# Patient Record
Sex: MALE | Race: BLACK OR AFRICAN AMERICAN | Employment: FULL TIME | URBAN - METROPOLITAN AREA
[De-identification: names, ages, dates, MRNs, and addresses within clinical notes are randomized per-mention and may not be internally consistent; named-entity substitution may affect disease eponyms.]

---

## 2019-01-11 ENCOUNTER — OFFICE VISIT (OUTPATIENT)
Dept: PEDIATRICS | Facility: CLINIC | Age: 49
End: 2019-01-11
Payer: COMMERCIAL

## 2019-01-11 VITALS
OXYGEN SATURATION: 96 % | HEIGHT: 75 IN | BODY MASS INDEX: 33.78 KG/M2 | DIASTOLIC BLOOD PRESSURE: 88 MMHG | SYSTOLIC BLOOD PRESSURE: 138 MMHG | HEART RATE: 76 BPM | TEMPERATURE: 97.8 F | WEIGHT: 271.7 LBS | RESPIRATION RATE: 18 BRPM

## 2019-01-11 DIAGNOSIS — Z00.00 ROUTINE GENERAL MEDICAL EXAMINATION AT A HEALTH CARE FACILITY: ICD-10-CM

## 2019-01-11 DIAGNOSIS — Z23 NEED FOR VACCINATION: Primary | ICD-10-CM

## 2019-01-11 PROCEDURE — 90471 IMMUNIZATION ADMIN: CPT | Performed by: INTERNAL MEDICINE

## 2019-01-11 PROCEDURE — 80061 LIPID PANEL: CPT | Performed by: INTERNAL MEDICINE

## 2019-01-11 PROCEDURE — 99386 PREV VISIT NEW AGE 40-64: CPT | Mod: 25 | Performed by: INTERNAL MEDICINE

## 2019-01-11 PROCEDURE — 36415 COLL VENOUS BLD VENIPUNCTURE: CPT | Performed by: INTERNAL MEDICINE

## 2019-01-11 PROCEDURE — 82947 ASSAY GLUCOSE BLOOD QUANT: CPT | Performed by: INTERNAL MEDICINE

## 2019-01-11 PROCEDURE — 90715 TDAP VACCINE 7 YRS/> IM: CPT | Performed by: INTERNAL MEDICINE

## 2019-01-11 ASSESSMENT — ENCOUNTER SYMPTOMS
NERVOUS/ANXIOUS: 0
FREQUENCY: 0
HEADACHES: 0
CONSTIPATION: 0
SORE THROAT: 0
COUGH: 0
ABDOMINAL PAIN: 0
NAUSEA: 0
HEMATURIA: 0
ARTHRALGIAS: 0
PARESTHESIAS: 0
SHORTNESS OF BREATH: 0
MYALGIAS: 0
HEMATOCHEZIA: 0
DYSURIA: 0
WEAKNESS: 0
FEVER: 0
DIZZINESS: 0
JOINT SWELLING: 0
EYE PAIN: 0
PALPITATIONS: 0
HEARTBURN: 0
DIARRHEA: 0
CHILLS: 0

## 2019-01-11 ASSESSMENT — MIFFLIN-ST. JEOR: SCORE: 2193.05

## 2019-01-11 NOTE — NURSING NOTE
Screening Questionnaire for Adult Immunization    Are you sick today?   No   Do you have allergies to medications, food, a vaccine component or latex?   No   Have you ever had a serious reaction after receiving a vaccination?   No   Do you have a long-term health problem with heart disease, lung disease, asthma, kidney disease, metabolic disease (e.g. diabetes), anemia, or other blood disorder?   No   Do you have cancer, leukemia, HIV/AIDS, or any other immune system problem?   No   In the past 3 months, have you taken medications that affect  your immune system, such as prednisone, other steroids, or anticancer drugs; drugs for the treatment of rheumatoid arthritis, Crohn s disease, or psoriasis; or have you had radiation treatments?   No   Have you had a seizure, or a brain or other nervous system problem?   No   During the past year, have you received a transfusion of blood or blood     products, or been given immune (gamma) globulin or antiviral drug?   No   For women: Are you pregnant or is there a chance you could become        pregnant during the next month?   No   Have you received any vaccinations in the past 4 weeks?   No     Immunization questionnaire answers were all negative.        Per orders of Dr. Pal, injection of Adacel (TDaP) given by Jose Martin Coleman. Patient instructed to remain in clinic for 15 minutes afterwards, and to report any adverse reaction to me immediately.       Screening performed by Jose Martin Coleman CMA on 1/11/2019 at 8:09 AM.

## 2019-01-11 NOTE — PATIENT INSTRUCTIONS
Check your blood pressure periodically     Optimal BP readings: <130 / <85   Too high: >140 / >90   Very high: >180 / >105      Preventive Health Recommendations    Yearly exam:             See your health care provider every year in order to  o   Review health changes.   o   Discuss preventive care.    o   Review your medicines.    Have a cholesterol test at least every 5 years.     Have a colonoscopy (test for colon cancer) if someone in your family has had colon cancer or polyps before age 50.     Have a diabetes test (fasting glucose) every 1-2 years.      Shots: Get a flu shot each year. Get a tetanus shot every 10 years.     Nutrition:    Eat at least 5 servings of fruits and vegetables daily.     Eat whole-grain bread, whole-wheat pasta and brown rice instead of white grains and rice.     Get adequate Calcium and Vitamin D.     Lifestyle    Exercise for at least 150 minutes a week (30 minutes a day, 5 days a week). This will help you control your weight and prevent disease.     Limit alcohol to one drink per day.     No smoking.     Wear sunscreen to prevent skin cancer.     See your dentist every six months for an exam and cleaning.

## 2019-01-11 NOTE — PROGRESS NOTES
SUBJECTIVE:   CC: José Miguel Bansal is an 48 year old male who presents for preventative health visit.     Physical   Annual:     Getting at least 3 servings of Calcium per day:  Yes    Bi-annual eye exam:  Yes    Dental care twice a year:  Yes    Sleep apnea or symptoms of sleep apnea:  None    Diet:  Regular (no restrictions)    Frequency of exercise:  2-3 days/week    Duration of exercise:  30-45 minutes    Taking medications regularly:  Not Applicable    Additional concerns today:  No    PHQ-2 Total Score: 0    No acute c/o today. LOV here was over 10 years ago.    Has been dx w/ situational anxiety. Using CBD oil, does help his sx.     Obesity. Plans to start working on weight loss.  Wt Readings from Last 4 Encounters:   01/11/19 123.2 kg (271 lb 11.2 oz)   01/18/08 115 kg (253 lb 8 oz)   01/16/08 117.9 kg (260 lb)   12/17/04 112.9 kg (249 lb)     Today's PHQ-2 Score:   PHQ-2 ( 1999 Pfizer) 1/11/2019   Q1: Little interest or pleasure in doing things 0   Q2: Feeling down, depressed or hopeless 0   PHQ-2 Score 0   Q1: Little interest or pleasure in doing things Not at all   Q2: Feeling down, depressed or hopeless Not at all   PHQ-2 Score 0       Abuse: Current or Past(Physical, Sexual or Emotional)- No  Do you feel safe in your environment? Yes    Social History     Tobacco Use     Smoking status: Never Smoker   Substance Use Topics     Alcohol use: Yes     Alcohol/week: 4.0 oz     Alcohol Use 1/11/2019   If you drink alcohol do you typically have greater than 3 drinks per day OR greater than 7 drinks per week? No       Reviewed orders with patient. Reviewed health maintenance and updated orders accordingly - Yes      Reviewed and updated as needed this visit by Provider  Allergies  Meds  Problems  Med Hx  Surg Hx  Fam Hx          Review of Systems   Constitutional: Negative for chills and fever.   HENT: Negative for congestion, ear pain, hearing loss and sore throat.    Eyes: Negative for pain and visual  "disturbance.   Respiratory: Negative for cough and shortness of breath.    Cardiovascular: Negative for chest pain, palpitations and peripheral edema.   Gastrointestinal: Negative for abdominal pain, constipation, diarrhea, heartburn, hematochezia and nausea.   Genitourinary: Negative for dysuria, frequency, genital sores, hematuria and urgency.   Musculoskeletal: Negative for arthralgias, joint swelling and myalgias.   Skin: Negative for rash.   Neurological: Negative for dizziness, weakness, headaches and paresthesias.   Psychiatric/Behavioral: Negative for mood changes. The patient is not nervous/anxious.        OBJECTIVE:   /88   Pulse 76   Temp 97.8  F (36.6  C) (Oral)   Resp 18   Ht 1.905 m (6' 3\")   Wt 123.2 kg (271 lb 11.2 oz)   SpO2 96%   BMI 33.96 kg/m      Physical Exam  GENERAL: healthy, alert and no distress  EYES: Eyes grossly normal to inspection, PERRL and conjunctivae and sclerae normal  HENT: ear canals and TM's normal, nose and mouth without ulcers or lesions  NECK: no adenopathy, no asymmetry, masses, or scars and thyroid normal to palpation  RESP: lungs clear to auscultation - no rales, rhonchi or wheezes  CV: regular rate and rhythm, normal S1 S2, no S3 or S4, no murmur, click or rub, no peripheral edema and peripheral pulses strong  ABDOMEN: soft, nontender, no hepatosplenomegaly, no masses and bowel sounds normal  : normal male genitalia without lesions or urethral discharge, no hernia  MS: no gross musculoskeletal defects noted, no edema  SKIN: no suspicious lesions or rashes  NEURO: Normal strength and tone, mentation intact and speech normal  PSYCH: mentation appears normal, affect normal/bright    ASSESSMENT/PLAN:       ICD-10-CM    1. Need for vaccination Z23 TDAP, IM (10 - 64 YRS) - Adacel   2. Routine general medical examination at a health care facility Z00.00 GLUCOSE     Lipid Profile (Chol, Trig, HDL, LDL calc)       COUNSELING:   Reviewed preventive health " "counseling, as reflected in patient instructions    BP Readings from Last 1 Encounters:   01/11/19 138/88     Estimated body mass index is 33.96 kg/m  as calculated from the following:    Height as of this encounter: 1.905 m (6' 3\").    Weight as of this encounter: 123.2 kg (271 lb 11.2 oz).    BP Screening:   Last 3 BP Readings:    BP Readings from Last 3 Encounters:   01/11/19 138/88   01/18/08 132/88   01/16/08 124/88     The following was recommended to the patient:  Re-screen BP within a year and recommended lifestyle modifications     reports that  has never smoked. He does not have any smokeless tobacco history on file.    Jamal Pal MD  [facility]  "

## 2019-01-12 LAB
CHOLEST SERPL-MCNC: 269 MG/DL
GLUCOSE SERPL-MCNC: 92 MG/DL (ref 70–99)
HDLC SERPL-MCNC: 34 MG/DL
LDLC SERPL CALC-MCNC: 212 MG/DL
NONHDLC SERPL-MCNC: 235 MG/DL
TRIGL SERPL-MCNC: 117 MG/DL

## 2019-10-01 ENCOUNTER — HEALTH MAINTENANCE LETTER (OUTPATIENT)
Age: 49
End: 2019-10-01

## 2020-03-19 ENCOUNTER — TELEPHONE (OUTPATIENT)
Dept: PEDIATRICS | Facility: CLINIC | Age: 50
End: 2020-03-19

## 2020-03-19 NOTE — TELEPHONE ENCOUNTER
Left voicemail for patient to call us back and we need to reschedule patient's appointment 90 days out due to COVID 19.

## 2020-03-22 ENCOUNTER — HEALTH MAINTENANCE LETTER (OUTPATIENT)
Age: 50
End: 2020-03-22

## 2020-06-25 ENCOUNTER — TELEPHONE (OUTPATIENT)
Dept: PEDIATRICS | Facility: CLINIC | Age: 50
End: 2020-06-25

## 2020-06-25 NOTE — TELEPHONE ENCOUNTER
1st attempt: Patient's appointment 07/10/20 needs to be rescheduled to the fall (August/September).  Left message for patient informing him of this. Requested a call back to reschedule.  Provided clinic number.  Upon return call, please reschedule patient for August or later.    Randi Esqueda

## 2020-06-29 NOTE — TELEPHONE ENCOUNTER
2nd attempt:  Left message for patient to reschedule 7/10/20 appointment, due to provider availability.  Requested a call back to reschedule. Provided clinic number. Patient can reschedule to fall, otherwise can reschedule to this week sometime if patient would like sooner appointment.     Randi Esqueda

## 2020-07-01 NOTE — TELEPHONE ENCOUNTER
3rd attempt:  Left message and sent mychart message to patient to reschedule preventative visit to the fall.  Appointment has been cancelled at this time.     Randi Esqueda

## 2020-09-23 ENCOUNTER — OFFICE VISIT (OUTPATIENT)
Dept: PEDIATRICS | Facility: CLINIC | Age: 50
End: 2020-09-23
Payer: COMMERCIAL

## 2020-09-23 VITALS
BODY MASS INDEX: 31.21 KG/M2 | TEMPERATURE: 97.7 F | OXYGEN SATURATION: 98 % | RESPIRATION RATE: 16 BRPM | HEIGHT: 75 IN | SYSTOLIC BLOOD PRESSURE: 128 MMHG | DIASTOLIC BLOOD PRESSURE: 86 MMHG | HEART RATE: 76 BPM | WEIGHT: 251 LBS

## 2020-09-23 DIAGNOSIS — Z00.00 ROUTINE GENERAL MEDICAL EXAMINATION AT A HEALTH CARE FACILITY: Primary | ICD-10-CM

## 2020-09-23 PROCEDURE — 99396 PREV VISIT EST AGE 40-64: CPT | Performed by: INTERNAL MEDICINE

## 2020-09-23 PROCEDURE — 80061 LIPID PANEL: CPT | Performed by: INTERNAL MEDICINE

## 2020-09-23 PROCEDURE — 36415 COLL VENOUS BLD VENIPUNCTURE: CPT | Performed by: INTERNAL MEDICINE

## 2020-09-23 PROCEDURE — 80053 COMPREHEN METABOLIC PANEL: CPT | Performed by: INTERNAL MEDICINE

## 2020-09-23 ASSESSMENT — ENCOUNTER SYMPTOMS
SHORTNESS OF BREATH: 0
SORE THROAT: 0
ABDOMINAL PAIN: 0
CONSTIPATION: 0
JOINT SWELLING: 0
HEMATURIA: 0
FEVER: 0
EYE PAIN: 0
PARESTHESIAS: 0
HEARTBURN: 0
DIARRHEA: 0
HEADACHES: 0
NERVOUS/ANXIOUS: 0
PALPITATIONS: 0
DIZZINESS: 0
ARTHRALGIAS: 0
DYSURIA: 0
CHILLS: 0
MYALGIAS: 0
HEMATOCHEZIA: 0
NAUSEA: 0
FREQUENCY: 0
COUGH: 0
WEAKNESS: 0

## 2020-09-23 ASSESSMENT — MIFFLIN-ST. JEOR: SCORE: 2094.16

## 2020-09-23 NOTE — PATIENT INSTRUCTIONS
Preventive Health Recommendations    Yearly exam:             See your health care provider every year in order to  o   Review health changes.   o   Discuss preventive care.      Have a cholesterol test every year.     Have a colonoscopy (test for colon cancer) if someone in your family has had colon cancer or polyps before age 50.     Shots: Get a flu shot each year. Get a tetanus shot every 10 years.      Nutrition:    Eat at least 5 servings of fruits and vegetables daily.     Eat whole-grain bread, whole-wheat pasta and brown rice instead of white grains and rice.     Get adequate Calcium and Vitamin D.     Lifestyle    Exercise for at least 150 minutes a week (30 minutes a day, 5 days a week). This will help you control your weight and prevent disease.     Limit alcohol to one drink per day.     No smoking.     Wear sunscreen to prevent skin cancer.     See your dentist every six months for an exam and cleaning.

## 2020-09-23 NOTE — PROGRESS NOTES
SUBJECTIVE:   CC: José Miguel Bansal is an 49 year old male who presents for preventative health visit.       Healthy Habits:     Getting at least 3 servings of Calcium per day:  Yes    Bi-annual eye exam:  Yes    Dental care twice a year:  Yes    Sleep apnea or symptoms of sleep apnea:  None    Diet:  Regular (no restrictions)    Frequency of exercise:  4-5 days/week    Duration of exercise:  30-45 minutes    Taking medications regularly:  Not Applicable    Medication side effects:  Not applicable    PHQ-2 Total Score: 0    Additional concerns today:  No    Initial BP is elevated today.  BP Readings from Last 3 Encounters:   09/23/20 (!) 158/110   01/11/19 138/88   01/18/08 132/88     Wt Readings from Last 4 Encounters:   09/23/20 113.9 kg (251 lb)   01/11/19 123.2 kg (271 lb 11.2 oz)   01/18/08 115 kg (253 lb 8 oz)   01/16/08 117.9 kg (260 lb)     No cardiac sx such as CP, palpitations, PND, orthopnea, LOVE or peripheral edema.     Reviewed prior lab results. LDL has been quite high.  Lab Results   Component Value Date     01/11/2019     12/17/2004     No meds currently for lipids.  His mother had an MI in her 60's.  Discussed that he may need a statin.  Is not fasting today but still wants labwork drawn.     Today's PHQ-2 Score:   PHQ-2 ( 1999 Pfizer) 9/23/2020   Q1: Little interest or pleasure in doing things 0   Q2: Feeling down, depressed or hopeless 0   PHQ-2 Score 0   Q1: Little interest or pleasure in doing things Not at all   Q2: Feeling down, depressed or hopeless Not at all   PHQ-2 Score 0       Abuse: Current or Past(Physical, Sexual or Emotional)- No  Do you feel safe in your environment? Yes        Social History     Tobacco Use     Smoking status: Never Smoker     Smokeless tobacco: Never Used   Substance Use Topics     Alcohol use: Not Currently     Alcohol/week: 6.7 standard drinks         Alcohol Use 9/23/2020   Prescreen: >3 drinks/day or >7 drinks/week? No       Reviewed orders with  "patient. Reviewed health maintenance and updated orders accordingly - Yes      Reviewed and updated as needed this visit by Provider  Tobacco  Allergies  Meds  Problems  Med Hx  Surg Hx  Fam Hx            Review of Systems   Constitutional: Negative for chills and fever.   HENT: Negative for congestion, ear pain, hearing loss and sore throat.    Eyes: Negative for pain and visual disturbance.   Respiratory: Negative for cough and shortness of breath.    Cardiovascular: Negative for chest pain, palpitations and peripheral edema.   Gastrointestinal: Negative for abdominal pain, constipation, diarrhea, heartburn, hematochezia and nausea.   Genitourinary: Negative for discharge, dysuria, frequency, genital sores, hematuria, impotence and urgency.   Musculoskeletal: Negative for arthralgias, joint swelling and myalgias.   Skin: Negative for rash.   Neurological: Negative for dizziness, weakness, headaches and paresthesias.   Psychiatric/Behavioral: Negative for mood changes. The patient is not nervous/anxious.        OBJECTIVE:   /86   Pulse 76   Temp 97.7  F (36.5  C) (Tympanic)   Resp 16   Ht 1.905 m (6' 3\")   Wt 113.9 kg (251 lb)   SpO2 98%   BMI 31.37 kg/m      Physical Exam  GENERAL: healthy, alert and no distress  EYES: Eyes grossly normal to inspection, PERRL and conjunctivae and sclerae normal  HENT: ear canals and TM's normal  NECK: no adenopathy, no asymmetry, masses, or scars and thyroid normal to palpation  RESP: lungs clear to auscultation - no rales, rhonchi or wheezes  CV: regular rate and rhythm, normal S1 S2, no S3 or S4, no murmur, click or rub, no peripheral edema and peripheral pulses strong  ABDOMEN: soft, nontender, no hepatosplenomegaly, no masses and bowel sounds normal  MS: no gross musculoskeletal defects noted, no edema  SKIN: no suspicious lesions or rashes  NEURO: Normal strength and tone, mentation intact and speech normal  PSYCH: mentation appears normal, affect " "normal/bright      ASSESSMENT/PLAN:       ICD-10-CM    1. Routine general medical examination at a health care facility  Z00.00 Lipid Profile (Chol, Trig, HDL, LDL calc)     Comprehensive metabolic panel (BMP + Alb, Alk Phos, ALT, AST, Total. Bili, TP)     Repeat labs show significant improvement in LDL:    Lab Results   Component Value Date     09/23/2020     01/11/2019     No meds needed at this time for lipids.    Patient has been advised of split billing requirements and indicates understanding: Yes  COUNSELING:   Reviewed preventive health counseling, as reflected in patient instructions    Estimated body mass index is 31.37 kg/m  as calculated from the following:    Height as of this encounter: 1.905 m (6' 3\").    Weight as of this encounter: 113.9 kg (251 lb).     Weight management plan: Discussed healthy diet and exercise guidelines    He reports that he has never smoked. He has never used smokeless tobacco.      Jamal Pal MD  [facility]  "

## 2020-09-24 LAB
ALBUMIN SERPL-MCNC: 4.2 G/DL (ref 3.4–5)
ALP SERPL-CCNC: 94 U/L (ref 40–150)
ALT SERPL W P-5'-P-CCNC: 96 U/L (ref 0–70)
ANION GAP SERPL CALCULATED.3IONS-SCNC: 9 MMOL/L (ref 3–14)
AST SERPL W P-5'-P-CCNC: 103 U/L (ref 0–45)
BILIRUB SERPL-MCNC: 0.5 MG/DL (ref 0.2–1.3)
BUN SERPL-MCNC: 9 MG/DL (ref 7–30)
CALCIUM SERPL-MCNC: 9.5 MG/DL (ref 8.5–10.1)
CHLORIDE SERPL-SCNC: 106 MMOL/L (ref 94–109)
CHOLEST SERPL-MCNC: 212 MG/DL
CO2 SERPL-SCNC: 22 MMOL/L (ref 20–32)
CREAT SERPL-MCNC: 0.93 MG/DL (ref 0.66–1.25)
GFR SERPL CREATININE-BSD FRML MDRD: >90 ML/MIN/{1.73_M2}
GLUCOSE SERPL-MCNC: 85 MG/DL (ref 70–99)
HDLC SERPL-MCNC: 46 MG/DL
LDLC SERPL CALC-MCNC: 139 MG/DL
NONHDLC SERPL-MCNC: 166 MG/DL
POTASSIUM SERPL-SCNC: 3.9 MMOL/L (ref 3.4–5.3)
PROT SERPL-MCNC: 8.8 G/DL (ref 6.8–8.8)
SODIUM SERPL-SCNC: 137 MMOL/L (ref 133–144)
TRIGL SERPL-MCNC: 133 MG/DL

## 2021-01-15 ENCOUNTER — HEALTH MAINTENANCE LETTER (OUTPATIENT)
Age: 51
End: 2021-01-15

## 2021-04-05 ENCOUNTER — IMMUNIZATION (OUTPATIENT)
Dept: NURSING | Facility: CLINIC | Age: 51
End: 2021-04-05
Payer: COMMERCIAL

## 2021-04-05 PROCEDURE — 91300 PR COVID VAC PFIZER DIL RECON 30 MCG/0.3 ML IM: CPT

## 2021-04-05 PROCEDURE — 0001A PR COVID VAC PFIZER DIL RECON 30 MCG/0.3 ML IM: CPT

## 2021-04-26 ENCOUNTER — IMMUNIZATION (OUTPATIENT)
Dept: NURSING | Facility: CLINIC | Age: 51
End: 2021-04-26
Attending: INTERNAL MEDICINE
Payer: COMMERCIAL

## 2021-04-26 PROCEDURE — 0002A PR COVID VAC PFIZER DIL RECON 30 MCG/0.3 ML IM: CPT

## 2021-04-26 PROCEDURE — 91300 PR COVID VAC PFIZER DIL RECON 30 MCG/0.3 ML IM: CPT

## 2021-09-04 ENCOUNTER — HEALTH MAINTENANCE LETTER (OUTPATIENT)
Age: 51
End: 2021-09-04

## 2021-10-30 ENCOUNTER — HEALTH MAINTENANCE LETTER (OUTPATIENT)
Age: 51
End: 2021-10-30

## 2022-07-13 ENCOUNTER — MYC MEDICAL ADVICE (OUTPATIENT)
Dept: PEDIATRICS | Facility: CLINIC | Age: 52
End: 2022-07-13

## 2022-07-13 DIAGNOSIS — T75.3XXA MOTION SICKNESS, INITIAL ENCOUNTER: Primary | ICD-10-CM

## 2022-07-14 RX ORDER — SCOLOPAMINE TRANSDERMAL SYSTEM 1 MG/1
1 PATCH, EXTENDED RELEASE TRANSDERMAL
Qty: 4 PATCH | Refills: 1 | Status: SHIPPED | OUTPATIENT
Start: 2022-07-14 | End: 2023-04-07

## 2022-07-14 NOTE — TELEPHONE ENCOUNTER
Dr. Pal,     Please see [facility] message from pt regarding motion sickness patch    Med pended- please review    Thank you  Nemo Puente RN on 7/14/2022 at 8:52 AM

## 2022-08-12 ENCOUNTER — TRANSFERRED RECORDS (OUTPATIENT)
Dept: HEALTH INFORMATION MANAGEMENT | Facility: CLINIC | Age: 52
End: 2022-08-12

## 2022-10-16 ENCOUNTER — HEALTH MAINTENANCE LETTER (OUTPATIENT)
Age: 52
End: 2022-10-16

## 2022-12-01 ENCOUNTER — TRANSFERRED RECORDS (OUTPATIENT)
Dept: HEALTH INFORMATION MANAGEMENT | Facility: CLINIC | Age: 52
End: 2022-12-01

## 2022-12-03 ENCOUNTER — HEALTH MAINTENANCE LETTER (OUTPATIENT)
Age: 52
End: 2022-12-03

## 2023-04-07 ENCOUNTER — OFFICE VISIT (OUTPATIENT)
Dept: PEDIATRICS | Facility: CLINIC | Age: 53
End: 2023-04-07
Payer: COMMERCIAL

## 2023-04-07 VITALS
RESPIRATION RATE: 14 BRPM | WEIGHT: 225 LBS | OXYGEN SATURATION: 99 % | SYSTOLIC BLOOD PRESSURE: 134 MMHG | BODY MASS INDEX: 27.98 KG/M2 | HEART RATE: 75 BPM | HEIGHT: 75 IN | DIASTOLIC BLOOD PRESSURE: 88 MMHG

## 2023-04-07 DIAGNOSIS — Z12.11 SCREEN FOR COLON CANCER: ICD-10-CM

## 2023-04-07 DIAGNOSIS — Z00.00 ROUTINE GENERAL MEDICAL EXAMINATION AT A HEALTH CARE FACILITY: Primary | ICD-10-CM

## 2023-04-07 LAB
CHOLEST SERPL-MCNC: 204 MG/DL
FASTING STATUS PATIENT QL REPORTED: YES
GLUCOSE SERPL-MCNC: 87 MG/DL (ref 70–99)
HDLC SERPL-MCNC: 43 MG/DL
LDLC SERPL CALC-MCNC: 149 MG/DL
NONHDLC SERPL-MCNC: 161 MG/DL
PSA SERPL DL<=0.01 NG/ML-MCNC: 0.84 NG/ML (ref 0–3.5)
TRIGL SERPL-MCNC: 58 MG/DL

## 2023-04-07 PROCEDURE — 36415 COLL VENOUS BLD VENIPUNCTURE: CPT | Performed by: INTERNAL MEDICINE

## 2023-04-07 PROCEDURE — 82947 ASSAY GLUCOSE BLOOD QUANT: CPT | Performed by: INTERNAL MEDICINE

## 2023-04-07 PROCEDURE — G0103 PSA SCREENING: HCPCS | Performed by: INTERNAL MEDICINE

## 2023-04-07 PROCEDURE — 99396 PREV VISIT EST AGE 40-64: CPT | Performed by: INTERNAL MEDICINE

## 2023-04-07 PROCEDURE — 80061 LIPID PANEL: CPT | Performed by: INTERNAL MEDICINE

## 2023-04-07 SDOH — ECONOMIC STABILITY: TRANSPORTATION INSECURITY
IN THE PAST 12 MONTHS, HAS THE LACK OF TRANSPORTATION KEPT YOU FROM MEDICAL APPOINTMENTS OR FROM GETTING MEDICATIONS?: NO

## 2023-04-07 SDOH — HEALTH STABILITY: PHYSICAL HEALTH: ON AVERAGE, HOW MANY DAYS PER WEEK DO YOU ENGAGE IN MODERATE TO STRENUOUS EXERCISE (LIKE A BRISK WALK)?: 3 DAYS

## 2023-04-07 SDOH — ECONOMIC STABILITY: INCOME INSECURITY: HOW HARD IS IT FOR YOU TO PAY FOR THE VERY BASICS LIKE FOOD, HOUSING, MEDICAL CARE, AND HEATING?: NOT HARD AT ALL

## 2023-04-07 SDOH — ECONOMIC STABILITY: FOOD INSECURITY: WITHIN THE PAST 12 MONTHS, YOU WORRIED THAT YOUR FOOD WOULD RUN OUT BEFORE YOU GOT MONEY TO BUY MORE.: NEVER TRUE

## 2023-04-07 SDOH — HEALTH STABILITY: PHYSICAL HEALTH: ON AVERAGE, HOW MANY MINUTES DO YOU ENGAGE IN EXERCISE AT THIS LEVEL?: 30 MIN

## 2023-04-07 SDOH — ECONOMIC STABILITY: FOOD INSECURITY: WITHIN THE PAST 12 MONTHS, THE FOOD YOU BOUGHT JUST DIDN'T LAST AND YOU DIDN'T HAVE MONEY TO GET MORE.: NEVER TRUE

## 2023-04-07 SDOH — ECONOMIC STABILITY: INCOME INSECURITY: IN THE LAST 12 MONTHS, WAS THERE A TIME WHEN YOU WERE NOT ABLE TO PAY THE MORTGAGE OR RENT ON TIME?: NO

## 2023-04-07 SDOH — ECONOMIC STABILITY: TRANSPORTATION INSECURITY
IN THE PAST 12 MONTHS, HAS LACK OF TRANSPORTATION KEPT YOU FROM MEETINGS, WORK, OR FROM GETTING THINGS NEEDED FOR DAILY LIVING?: NO

## 2023-04-07 ASSESSMENT — SOCIAL DETERMINANTS OF HEALTH (SDOH)
DO YOU BELONG TO ANY CLUBS OR ORGANIZATIONS SUCH AS CHURCH GROUPS UNIONS, FRATERNAL OR ATHLETIC GROUPS, OR SCHOOL GROUPS?: NO
HOW OFTEN DO YOU GET TOGETHER WITH FRIENDS OR RELATIVES?: TWICE A WEEK
HOW OFTEN DO YOU ATTEND CHURCH OR RELIGIOUS SERVICES?: 1 TO 4 TIMES PER YEAR
IN A TYPICAL WEEK, HOW MANY TIMES DO YOU TALK ON THE PHONE WITH FAMILY, FRIENDS, OR NEIGHBORS?: MORE THAN THREE TIMES A WEEK

## 2023-04-07 ASSESSMENT — ENCOUNTER SYMPTOMS
PARESTHESIAS: 0
COUGH: 0
DYSURIA: 0
EYE PAIN: 0
ABDOMINAL PAIN: 0
NERVOUS/ANXIOUS: 0
FREQUENCY: 0
HEMATOCHEZIA: 0
PALPITATIONS: 0
WEAKNESS: 0
NAUSEA: 0
DIARRHEA: 0
SHORTNESS OF BREATH: 0
HEARTBURN: 0
SORE THROAT: 0
HEADACHES: 0
MYALGIAS: 0
JOINT SWELLING: 0
ARTHRALGIAS: 0
DIZZINESS: 0
CONSTIPATION: 0
HEMATURIA: 0
CHILLS: 0
FEVER: 0

## 2023-04-07 ASSESSMENT — LIFESTYLE VARIABLES
HOW OFTEN DO YOU HAVE SIX OR MORE DRINKS ON ONE OCCASION: NEVER
AUDIT-C TOTAL SCORE: 3
HOW MANY STANDARD DRINKS CONTAINING ALCOHOL DO YOU HAVE ON A TYPICAL DAY: 1 OR 2
HOW OFTEN DO YOU HAVE A DRINK CONTAINING ALCOHOL: 2-3 TIMES A WEEK
SKIP TO QUESTIONS 9-10: 1

## 2023-04-07 NOTE — PROGRESS NOTES
SUBJECTIVE:   CC: José Miguel is an 52 year old who presents for preventative health visit.     Patient has been advised of split billing requirements and indicates understanding: Yes     Healthy Habits:     Getting at least 3 servings of Calcium per day:  Yes    Bi-annual eye exam:  Yes    Dental care twice a year:  Yes    Sleep apnea or symptoms of sleep apnea:  None    Diet:  Regular (no restrictions)    Frequency of exercise:  2-3 days/week    Duration of exercise:  30-45 minutes    Taking medications regularly:  Yes    PHQ-2 Total Score: 0    Additional concerns today:  No    Here for CPE. Overall feeling well. No acute concerns today.     Today's PHQ-2 Score:       4/7/2023     7:45 AM   PHQ-2 ( 1999 Pfizer)   Q1: Little interest or pleasure in doing things 0   Q2: Feeling down, depressed or hopeless 0   PHQ-2 Score 0   Q1: Little interest or pleasure in doing things Not at all    Not at all   Q2: Feeling down, depressed or hopeless Not at all    Not at all   PHQ-2 Score 0    0       Social History     Tobacco Use     Smoking status: Never     Smokeless tobacco: Never   Vaping Use     Vaping status: Never Used   Substance Use Topics     Alcohol use: Not Currently     Alcohol/week: 6.7 standard drinks of alcohol             4/7/2023     7:45 AM   Alcohol Use   Prescreen: >3 drinks/day or >7 drinks/week? No     Last PSA: No results found for: PSA    Reviewed orders with patient. Reviewed health maintenance and updated orders accordingly - Yes        Review of Systems   Constitutional: Negative for chills and fever.   HENT: Negative for congestion, ear pain, hearing loss and sore throat.    Eyes: Negative for pain and visual disturbance.   Respiratory: Negative for cough and shortness of breath.    Cardiovascular: Negative for chest pain, palpitations and peripheral edema.   Gastrointestinal: Negative for abdominal pain, constipation, diarrhea, heartburn, hematochezia and nausea.   Genitourinary: Negative for dysuria,  "frequency, genital sores, hematuria, impotence and urgency.   Musculoskeletal: Negative for arthralgias, joint swelling and myalgias.   Skin: Negative for rash.   Neurological: Negative for dizziness, weakness, headaches and paresthesias.   Psychiatric/Behavioral: Negative for mood changes. The patient is not nervous/anxious.        OBJECTIVE:   /88   Pulse 75   Resp 14   Ht 1.905 m (6' 3\")   Wt 102.1 kg (225 lb)   SpO2 99%   BMI 28.12 kg/m      Physical Exam  GENERAL: healthy, alert and no distress  EYES: PERRL, EOMI  HENT: ear canals and TM's normal  NECK: no adenopathy  RESP: lungs clear to auscultation - no rales, rhonchi or wheezes  CV: regular rate and rhythm, normal S1 S2, no murmur, no peripheral edema and peripheral pulses strong  ABDOMEN: soft, nontender, bowel sounds normal  MS: no gross musculoskeletal defects noted  SKIN: no suspicious lesions or rashes  NEURO: Normal strength and tone  PSYCH: mentation appears normal, affect normal/bright       ASSESSMENT/PLAN:       ICD-10-CM    1. Routine general medical examination at a health care facility  Z00.00 Lipid panel reflex to direct LDL Fasting     Glucose     Prostate Specific Antigen Screen      2. Screen for colon cancer  Z12.11 Colonoscopy Screening  Referral         Overall doing well.  Routine screening tests ordered.       COUNSELING:   Reviewed preventive health counseling, as reflected in patient instructions        He reports that he has never smoked. He has never used smokeless tobacco.        Jamal Pal MD  [facility] [place]  "

## 2023-04-10 ENCOUNTER — TRANSFERRED RECORDS (OUTPATIENT)
Dept: HEALTH INFORMATION MANAGEMENT | Facility: CLINIC | Age: 53
End: 2023-04-10
Payer: COMMERCIAL

## 2023-07-07 ENCOUNTER — TRANSFERRED RECORDS (OUTPATIENT)
Dept: HEALTH INFORMATION MANAGEMENT | Facility: CLINIC | Age: 53
End: 2023-07-07
Payer: COMMERCIAL

## 2023-07-21 ENCOUNTER — TRANSFERRED RECORDS (OUTPATIENT)
Dept: HEALTH INFORMATION MANAGEMENT | Facility: CLINIC | Age: 53
End: 2023-07-21
Payer: COMMERCIAL

## 2023-07-27 ENCOUNTER — TRANSFERRED RECORDS (OUTPATIENT)
Dept: HEALTH INFORMATION MANAGEMENT | Facility: CLINIC | Age: 53
End: 2023-07-27
Payer: COMMERCIAL

## 2023-08-11 ENCOUNTER — OFFICE VISIT (OUTPATIENT)
Dept: PEDIATRICS | Facility: CLINIC | Age: 53
End: 2023-08-11
Payer: COMMERCIAL

## 2023-08-11 VITALS
BODY MASS INDEX: 28.5 KG/M2 | TEMPERATURE: 97.4 F | OXYGEN SATURATION: 98 % | SYSTOLIC BLOOD PRESSURE: 136 MMHG | RESPIRATION RATE: 14 BRPM | WEIGHT: 228 LBS | DIASTOLIC BLOOD PRESSURE: 84 MMHG | HEART RATE: 86 BPM

## 2023-08-11 DIAGNOSIS — S83.282D TEAR OF LATERAL MENISCUS OF LEFT KNEE, CURRENT, UNSPECIFIED TEAR TYPE, SUBSEQUENT ENCOUNTER: ICD-10-CM

## 2023-08-11 DIAGNOSIS — F98.8 ATTENTION DEFICIT DISORDER (ADD) WITHOUT HYPERACTIVITY: ICD-10-CM

## 2023-08-11 DIAGNOSIS — Z01.818 PREOP GENERAL PHYSICAL EXAM: Primary | ICD-10-CM

## 2023-08-11 PROCEDURE — 99214 OFFICE O/P EST MOD 30 MIN: CPT | Performed by: INTERNAL MEDICINE

## 2023-08-11 RX ORDER — ATOMOXETINE 40 MG/1
40 CAPSULE ORAL DAILY
Qty: 30 CAPSULE | Refills: 1 | Status: SHIPPED | OUTPATIENT
Start: 2023-08-11 | End: 2023-08-31

## 2023-08-11 NOTE — PROGRESS NOTES
[facility]  [address]  [address]  [place] [address]  Phone: [phone]  Fax: [phone]  Primary Provider: Jamal Pal      PREOPERATIVE EVALUATION:  Today's date: 8/11/2023    José Miguel Bansal is a 52 year old male who presents for a preoperative evaluation.    Surgical Information:  Surgery/Procedure: Left Knee Scopy  Surgery Location: [facility]  Surgeon:   Surgery Date: 8/30/2023  Time of Surgery: A.M.   Where patient plans to recover: At home with family  Fax number for surgical facility: [phone]    Assessment & Plan     The proposed surgical procedure is considered LOW risk.      ICD-10-CM    1. Preop general physical exam  Z01.818       2. Tear of lateral meniscus of left knee, current, unspecified tear type, subsequent encounter  S83.282D       3. Attention deficit disorder (ADD) without hyperactivity  F98.8 atomoxetine (STRATTERA) 40 MG capsule             RECOMMENDATION:  APPROVAL GIVEN to proceed with proposed procedure, without further diagnostic evaluation.    Of note he is restarting Strattera for treatment of ADD.  He can continue taking that medication to and through the time of his surgery.      Subjective       HPI related to upcoming procedure: He sustained a tear of his left knee medial meniscus.  Has been getting steroid injections which provide some relief but there is no plan for definitive management with surgical intervention        8/11/2023     8:47 AM   Preop Questions   1. Have you ever had a heart attack or stroke? No   2. Have you ever had surgery on your heart or blood vessels, such as a stent placement, a coronary artery bypass, or surgery on an artery in your head, neck, heart, or legs? No   3. Do you have chest pain with activity? No   4. Do you have a history of  heart failure? No   5. Do you currently have a cold, bronchitis or symptoms of other infection? No   6. Do you have a cough, shortness of breath, or  wheezing? No   7. Do you or anyone in your family have previous history of blood clots? No   8. Do you or does anyone in your family have a serious bleeding problem such as prolonged bleeding following surgeries or cuts? No   9. Have you ever had problems with anemia or been told to take iron pills? No   10. Have you had any abnormal blood loss such as black, tarry or bloody stools? No   11. Have you ever had a blood transfusion? No   12. Are you willing to have a blood transfusion if it is medically needed before, during, or after your surgery? Yes   13. Have you or any of your relatives ever had problems with anesthesia? No   14. Do you have sleep apnea, excessive snoring or daytime drowsiness? No   15. Do you have any artifical heart valves or other implanted medical devices like a pacemaker, defibrillator, or continuous glucose monitor? No   16. Do you have artificial joints? No   17. Are you allergic to latex? No     ADD history.  He had been treated with different medications in the past.  Typically did not tolerate stimulants due to increased anxiety symptoms.  Most recently has been treated with Strattera but is been off for the last few years.  He is noting worsening of his focus ability and would like to restart medications.    Patient Active Problem List    Diagnosis Date Noted    Attention deficit hyperactivity disorder (ADD) 12/17/2004      Past Medical History:   Diagnosis Date    Attention deficit disorder     had a bad reaction to excessive stimulants and now on Strattera alone     Past Surgical History:   Procedure Laterality Date    HC REPAIR ACHILLES TENDON,PRIMARY  1/22/08     No current outpatient medications on file.       Allergies   Allergen Reactions    No Known Drug Allergy         Social History     Tobacco Use    Smoking status: Never    Smokeless tobacco: Never   Substance Use Topics    Alcohol use: Not Currently     Alcohol/week: 6.7 standard drinks of alcohol       History   Drug Use  No         Objective     /84   Pulse 86   Temp 97.4  F (36.3  C) (Temporal)   Resp 14   Wt 103.4 kg (228 lb)   SpO2 98%   BMI 28.50 kg/m      Physical Exam  GENERAL: healthy, alert and no distress  EYES: PERRL, EOMI  HENT: ear canals and TM's normal. No nasal discharge. OP moist.  NECK: no adenopathy  RESP: lungs clear to auscultation - no rales, rhonchi or wheezes  CV: regular rate and rhythm, normal S1 S2, no murmur, no peripheral edema and peripheral pulses strong  ABDOMEN: soft, nontender, bowel sounds normal  SKIN: no suspicious lesions or rashes  PSYCH: mentation appears normal, affect normal/bright     Diagnostics:  No labs were ordered during this visit.       Signed Electronically by: Jamal Pal MD

## 2023-08-30 ENCOUNTER — TRANSFERRED RECORDS (OUTPATIENT)
Dept: HEALTH INFORMATION MANAGEMENT | Facility: CLINIC | Age: 53
End: 2023-08-30
Payer: COMMERCIAL

## 2023-08-31 ENCOUNTER — MYC MEDICAL ADVICE (OUTPATIENT)
Dept: PEDIATRICS | Facility: CLINIC | Age: 53
End: 2023-08-31
Payer: COMMERCIAL

## 2023-08-31 DIAGNOSIS — F98.8 ATTENTION DEFICIT DISORDER (ADD) WITHOUT HYPERACTIVITY: ICD-10-CM

## 2023-08-31 RX ORDER — ATOMOXETINE 40 MG/1
40 CAPSULE ORAL DAILY
Qty: 90 CAPSULE | Refills: 3 | Status: SHIPPED | OUTPATIENT
Start: 2023-08-31 | End: 2024-07-22

## 2023-09-11 ENCOUNTER — TRANSFERRED RECORDS (OUTPATIENT)
Dept: HEALTH INFORMATION MANAGEMENT | Facility: CLINIC | Age: 53
End: 2023-09-11
Payer: COMMERCIAL

## 2024-05-03 ENCOUNTER — E-VISIT (OUTPATIENT)
Dept: PEDIATRICS | Facility: CLINIC | Age: 54
End: 2024-05-03
Payer: COMMERCIAL

## 2024-05-03 DIAGNOSIS — G47.00 INSOMNIA, UNSPECIFIED TYPE: Primary | ICD-10-CM

## 2024-05-03 PROCEDURE — 99421 OL DIG E/M SVC 5-10 MIN: CPT | Performed by: INTERNAL MEDICINE

## 2024-05-03 RX ORDER — ZOLPIDEM TARTRATE 10 MG/1
10 TABLET ORAL
Qty: 15 TABLET | Refills: 0 | Status: SHIPPED | OUTPATIENT
Start: 2024-05-03 | End: 2024-07-22

## 2024-06-01 ENCOUNTER — MYC REFILL (OUTPATIENT)
Dept: PEDIATRICS | Facility: CLINIC | Age: 54
End: 2024-06-01
Payer: COMMERCIAL

## 2024-06-01 ENCOUNTER — HEALTH MAINTENANCE LETTER (OUTPATIENT)
Age: 54
End: 2024-06-01

## 2024-06-01 DIAGNOSIS — F98.8 ATTENTION DEFICIT DISORDER (ADD) WITHOUT HYPERACTIVITY: ICD-10-CM

## 2024-06-03 RX ORDER — ATOMOXETINE 40 MG/1
40 CAPSULE ORAL DAILY
Qty: 90 CAPSULE | Refills: 3 | OUTPATIENT
Start: 2024-06-03

## 2024-07-21 SDOH — HEALTH STABILITY: PHYSICAL HEALTH: ON AVERAGE, HOW MANY DAYS PER WEEK DO YOU ENGAGE IN MODERATE TO STRENUOUS EXERCISE (LIKE A BRISK WALK)?: 5 DAYS

## 2024-07-21 SDOH — HEALTH STABILITY: PHYSICAL HEALTH: ON AVERAGE, HOW MANY MINUTES DO YOU ENGAGE IN EXERCISE AT THIS LEVEL?: 30 MIN

## 2024-07-21 ASSESSMENT — SOCIAL DETERMINANTS OF HEALTH (SDOH): HOW OFTEN DO YOU GET TOGETHER WITH FRIENDS OR RELATIVES?: ONCE A WEEK

## 2024-07-22 ENCOUNTER — OFFICE VISIT (OUTPATIENT)
Dept: PEDIATRICS | Facility: CLINIC | Age: 54
End: 2024-07-22
Payer: COMMERCIAL

## 2024-07-22 VITALS
HEIGHT: 75 IN | DIASTOLIC BLOOD PRESSURE: 96 MMHG | RESPIRATION RATE: 18 BRPM | SYSTOLIC BLOOD PRESSURE: 160 MMHG | WEIGHT: 233.7 LBS | HEART RATE: 89 BPM | BODY MASS INDEX: 29.06 KG/M2 | OXYGEN SATURATION: 98 % | TEMPERATURE: 97.6 F

## 2024-07-22 DIAGNOSIS — Z00.00 ROUTINE GENERAL MEDICAL EXAMINATION AT A HEALTH CARE FACILITY: Primary | ICD-10-CM

## 2024-07-22 DIAGNOSIS — F98.8 ATTENTION DEFICIT DISORDER (ADD) WITHOUT HYPERACTIVITY: ICD-10-CM

## 2024-07-22 DIAGNOSIS — G47.00 INSOMNIA, UNSPECIFIED TYPE: ICD-10-CM

## 2024-07-22 DIAGNOSIS — I10 ESSENTIAL HYPERTENSION: ICD-10-CM

## 2024-07-22 PROCEDURE — 99396 PREV VISIT EST AGE 40-64: CPT | Performed by: INTERNAL MEDICINE

## 2024-07-22 PROCEDURE — 99214 OFFICE O/P EST MOD 30 MIN: CPT | Mod: 25 | Performed by: INTERNAL MEDICINE

## 2024-07-22 RX ORDER — ZOLPIDEM TARTRATE 10 MG/1
10 TABLET ORAL
Qty: 30 TABLET | Refills: 2 | Status: SHIPPED | OUTPATIENT
Start: 2024-07-22

## 2024-07-22 RX ORDER — AMLODIPINE BESYLATE 5 MG/1
5 TABLET ORAL DAILY
Qty: 90 TABLET | Refills: 3 | Status: SHIPPED | OUTPATIENT
Start: 2024-07-22

## 2024-07-22 RX ORDER — ATOMOXETINE 40 MG/1
40 CAPSULE ORAL DAILY
Qty: 90 CAPSULE | Refills: 3 | Status: SHIPPED | OUTPATIENT
Start: 2024-07-22

## 2024-07-22 ASSESSMENT — PAIN SCALES - GENERAL: PAINLEVEL: NO PAIN (0)

## 2024-07-22 NOTE — PROGRESS NOTES
"Preventive Care Visit  [facility]  Jamal Pal MD, Internal Medicine - Pediatrics  Jul 22, 2024      Assessment & Plan       ICD-10-CM    1. Routine general medical examination at a health care facility  Z00.00       2. Essential hypertension  I10 amLODIPine (NORVASC) 5 MG tablet      3. Attention deficit disorder (ADD) without hyperactivity  F98.8 atomoxetine (STRATTERA) 40 MG capsule      4. Insomnia, unspecified type  G47.00 zolpidem (AMBIEN) 10 MG tablet        Here for CPE. Overall he is feeling well. HM reviewed.     HTN. New diagnosis. BP has been elevated with several recent visits.   We reviewed treatment options.  Start amlodipine 5 mg daily.  Recheck BP in 2-3 weeks. Reviewed goals for BP.  Also reviewed lifestyle changes possible to further improve BP control.     ADD, insomnia. Routine medications updated.       BMI  Estimated body mass index is 29.41 kg/m  as calculated from the following:    Height as of this encounter: 1.899 m (6' 2.75\").    Weight as of this encounter: 106 kg (233 lb 11.2 oz).   Weight management plan: Discussed healthy diet and exercise guidelines    Jamal Pal MD        Clinton Valdez is a 53 year old, presenting for the following:  Physical        7/22/2024     7:04 AM   Additional Questions   Roomed by Yanet         7/22/2024     7:04 AM   Patient Reported Additional Medications   Patient reports taking the following new medications none        Health Care Directive  Patient does not have a Health Care Directive or Living Will: Discussed advance care planning with patient; however, patient declined at this time.    HPI    Here for CPE. Overall he is feeling well. We reviewed HM.    ADD. Strattera is working well to help with focus.    Insomnia. Intermittent sx. Has been under increased stress recently. Had rx for zolpidem recently for 15 tablets for travel. Now feels is needing more frequently. Would like rx updated.    HTN. BP is high today. " Reviewed past readings and has been high with most recent visits.  No prior treatment for BP.  Reviewed med options as well as lifestyle changes.     BP Readings from Last 3 Encounters:   07/22/24 (!) 160/96   08/11/23 136/84   04/07/23 134/88             7/21/2024   General Health   How would you rate your overall physical health? Good   Feel stress (tense, anxious, or unable to sleep) Only a little      (!) STRESS CONCERN      7/21/2024   Nutrition   Three or more servings of calcium each day? Yes   Diet: Regular (no restrictions)   How many servings of fruit and vegetables per day? (!) 2-3   How many sweetened beverages each day? 0-1            7/21/2024   Exercise   Days per week of moderate/strenous exercise 5 days   Average minutes spent exercising at this level 30 min            7/21/2024   Social Factors   Frequency of gathering with friends or relatives Once a week   Worry food won't last until get money to buy more No   Food not last or not have enough money for food? No   Do you have housing? (Housing is defined as stable permanent housing and does not include staying ouside in a car, in a tent, in an abandoned building, in an overnight shelter, or couch-surfing.) Yes   Are you worried about losing your housing? No   Lack of transportation? No   Unable to get utilities (heat,electricity)? No            7/21/2024   Fall Risk   Fallen 2 or more times in the past year? No   Trouble with walking or balance? No             7/21/2024   Dental   Dentist two times every year? Yes            7/21/2024   TB Screening   Were you born outside of the [place]? No          Today's PHQ-2 Score:       7/21/2024     9:09 PM   PHQ-2 ( 1999 [employer])   Q1: Little interest or pleasure in doing things 0   Q2: Feeling down, depressed or hopeless 0   PHQ-2 Score 0   Q1: Little interest or pleasure in doing things Not at all   Q2: Feeling down, depressed or hopeless Not at all   PHQ-2 Score 0           7/21/2024   Substance Use  "  Alcohol more than 3/day or more than 7/wk No   Do you use any other substances recreationally? No        Social History     Tobacco Use    Smoking status: Never    Smokeless tobacco: Never   Vaping Use    Vaping status: Never Used   Substance Use Topics    Alcohol use: Not Currently     Alcohol/week: 6.7 standard drinks of alcohol    Drug use: No           7/21/2024   STI Screening   New sexual partner(s) since last STI/HIV test? No      ASCVD Risk   The 10-year ASCVD risk score (Steph OLIVAREZ, et al., 2019) is: 9.8%    Values used to calculate the score:      Age: 53 years      Sex: Male      Is Non- : Yes      Diabetic: No      Tobacco smoker: No      Systolic Blood Pressure: 168 mmHg      Is BP treated: No      HDL Cholesterol: 43 mg/dL      Total Cholesterol: 204 mg/dL         Objective    Exam  BP (!) 160/96   Pulse 89   Temp 97.6  F (36.4  C) (Tympanic)   Resp 18   Ht 1.899 m (6' 2.75\")   Wt 106 kg (233 lb 11.2 oz)   SpO2 98%   BMI 29.41 kg/m     Estimated body mass index is 29.41 kg/m  as calculated from the following:    Height as of this encounter: 1.899 m (6' 2.75\").    Weight as of this encounter: 106 kg (233 lb 11.2 oz).    Physical Exam  GENERAL: healthy, alert and no distress  EYES: PERRL, EOMI  HENT: ear canals and TM's normal. No nasal discharge. OP moist.  NECK: no adenopathy  RESP: lungs clear to auscultation - no rales, rhonchi or wheezes  CV: regular rate and rhythm, normal S1 S2, no murmur, no peripheral edema  ABDOMEN: soft, nontender, bowel sounds normal  MS: no gross musculoskeletal defects noted  SKIN: no suspicious lesions or rashes  NEURO: Normal strength and tone  PSYCH: mentation appears normal, affect normal      Signed Electronically by: Jamal Pal MD    "

## 2024-12-04 ENCOUNTER — MYC REFILL (OUTPATIENT)
Dept: PEDIATRICS | Facility: CLINIC | Age: 54
End: 2024-12-04
Payer: COMMERCIAL

## 2024-12-04 DIAGNOSIS — G47.00 INSOMNIA, UNSPECIFIED TYPE: ICD-10-CM

## 2024-12-04 RX ORDER — ZOLPIDEM TARTRATE 10 MG/1
10 TABLET ORAL
Qty: 30 TABLET | Refills: 2 | Status: SHIPPED | OUTPATIENT
Start: 2024-12-04

## 2025-03-08 ENCOUNTER — E-VISIT (OUTPATIENT)
Dept: PEDIATRICS | Facility: CLINIC | Age: 55
End: 2025-03-08
Payer: COMMERCIAL

## 2025-03-08 DIAGNOSIS — G47.00 INSOMNIA, UNSPECIFIED TYPE: Primary | ICD-10-CM

## 2025-03-08 PROCEDURE — 99207 PR NON-BILLABLE SERV PER CHARTING: CPT | Performed by: INTERNAL MEDICINE

## 2025-05-14 DIAGNOSIS — I10 ESSENTIAL HYPERTENSION: ICD-10-CM

## 2025-05-14 RX ORDER — AMLODIPINE BESYLATE 5 MG/1
5 TABLET ORAL DAILY
Qty: 90 TABLET | Refills: 3 | OUTPATIENT
Start: 2025-05-14

## 2025-05-17 ENCOUNTER — MYC REFILL (OUTPATIENT)
Dept: PEDIATRICS | Facility: CLINIC | Age: 55
End: 2025-05-17
Payer: COMMERCIAL

## 2025-05-17 DIAGNOSIS — I10 ESSENTIAL HYPERTENSION: ICD-10-CM

## 2025-05-19 RX ORDER — AMLODIPINE BESYLATE 5 MG/1
5 TABLET ORAL DAILY
Qty: 90 TABLET | Refills: 3 | Status: SHIPPED | OUTPATIENT
Start: 2025-05-19

## 2025-06-07 ENCOUNTER — RESULTS FOLLOW-UP (OUTPATIENT)
Dept: PEDIATRICS | Facility: CLINIC | Age: 55
End: 2025-06-07

## 2025-07-28 ENCOUNTER — E-VISIT (OUTPATIENT)
Dept: PEDIATRICS | Facility: CLINIC | Age: 55
End: 2025-07-28
Payer: COMMERCIAL

## 2025-07-28 DIAGNOSIS — N52.9 ERECTILE DYSFUNCTION, UNSPECIFIED ERECTILE DYSFUNCTION TYPE: Primary | ICD-10-CM

## 2025-07-28 RX ORDER — TADALAFIL 5 MG/1
5 TABLET ORAL DAILY
Qty: 30 TABLET | Refills: 5 | Status: SHIPPED | OUTPATIENT
Start: 2025-07-28

## 2025-08-16 ENCOUNTER — MYC REFILL (OUTPATIENT)
Dept: PEDIATRICS | Facility: CLINIC | Age: 55
End: 2025-08-16
Payer: COMMERCIAL

## 2025-08-16 DIAGNOSIS — G47.00 INSOMNIA, UNSPECIFIED TYPE: ICD-10-CM

## 2025-08-18 RX ORDER — ZOLPIDEM TARTRATE 10 MG/1
10 TABLET ORAL
Qty: 30 TABLET | Refills: 5 | Status: SHIPPED | OUTPATIENT
Start: 2025-08-18

## 2025-08-19 ENCOUNTER — MYC REFILL (OUTPATIENT)
Dept: PEDIATRICS | Facility: CLINIC | Age: 55
End: 2025-08-19
Payer: COMMERCIAL

## 2025-08-19 DIAGNOSIS — G47.00 INSOMNIA, UNSPECIFIED TYPE: ICD-10-CM

## 2025-08-19 RX ORDER — ZOLPIDEM TARTRATE 10 MG/1
10 TABLET ORAL
Qty: 30 TABLET | Refills: 5 | Status: CANCELLED | OUTPATIENT
Start: 2025-08-19

## 2025-09-04 ENCOUNTER — MYC REFILL (OUTPATIENT)
Dept: PEDIATRICS | Facility: CLINIC | Age: 55
End: 2025-09-04
Payer: COMMERCIAL

## 2025-09-04 DIAGNOSIS — F98.8 ATTENTION DEFICIT DISORDER (ADD) WITHOUT HYPERACTIVITY: ICD-10-CM
